# Patient Record
Sex: FEMALE
[De-identification: names, ages, dates, MRNs, and addresses within clinical notes are randomized per-mention and may not be internally consistent; named-entity substitution may affect disease eponyms.]

---

## 2019-03-01 ENCOUNTER — HOSPITAL ENCOUNTER (INPATIENT)
Dept: HOSPITAL 92 - L&D | Age: 34
LOS: 22 days | Discharge: HOME | End: 2019-03-23
Attending: OBSTETRICS & GYNECOLOGY | Admitting: OBSTETRICS & GYNECOLOGY
Payer: COMMERCIAL

## 2019-03-01 VITALS — BODY MASS INDEX: 27.2 KG/M2

## 2019-03-01 DIAGNOSIS — O34.211: Primary | ICD-10-CM

## 2019-03-01 DIAGNOSIS — Z3A.39: ICD-10-CM

## 2019-03-01 PROCEDURE — 87340 HEPATITIS B SURFACE AG IA: CPT

## 2019-03-01 PROCEDURE — 86901 BLOOD TYPING SEROLOGIC RH(D): CPT

## 2019-03-01 PROCEDURE — 85027 COMPLETE CBC AUTOMATED: CPT

## 2019-03-01 PROCEDURE — 36415 COLL VENOUS BLD VENIPUNCTURE: CPT

## 2019-03-01 PROCEDURE — 90715 TDAP VACCINE 7 YRS/> IM: CPT

## 2019-03-01 PROCEDURE — 86850 RBC ANTIBODY SCREEN: CPT

## 2019-03-01 PROCEDURE — 51702 INSERT TEMP BLADDER CATH: CPT

## 2019-03-01 PROCEDURE — 86900 BLOOD TYPING SEROLOGIC ABO: CPT

## 2019-03-01 PROCEDURE — 86870 RBC ANTIBODY IDENTIFICATION: CPT

## 2019-03-01 PROCEDURE — 86905 BLOOD TYPING RBC ANTIGENS: CPT

## 2019-03-01 PROCEDURE — 88307 TISSUE EXAM BY PATHOLOGIST: CPT

## 2019-03-01 PROCEDURE — 86780 TREPONEMA PALLIDUM: CPT

## 2019-03-01 PROCEDURE — 86922 COMPATIBILITY TEST ANTIGLOB: CPT

## 2019-03-19 NOTE — PDOC.LDHP
Labor and Delivery H&P


Chief complaint: scheduled  section


Current gestational age (weeks): 39


Dating criteria: last menstrual period


Grav: 4


Para: 3


Current pregnancy complications: other (prior c/s x3. Family breast cancer. 

desires RRS-approved)


Abnormal US findings: No


Current medications: pre-aleida vitamins


Previous surgical history: low tranverse CS


Social history: none





- Physical Exam


General: NAD, resting, breathing through contractions, other


FHT: category 1





- OB Labs


Blood type: AB


RH: positive


Antibody Screen: negative


HIV: negative


RPR: negative


HEPSAg: negative


1 hour GCT: negative


GBS: positive


Urine drug screen: not done


Rubella: immune





- Assessment


L&D Assessment: scheduled repeat  section (RRS due to family h/o breast 

cancer.)

## 2019-03-20 LAB
HBSAG INDEX: 0.41 S/CO (ref 0–0.99)
HGB BLD-MCNC: 13.5 G/DL (ref 12–16)
MCH RBC QN AUTO: 32.3 PG (ref 27–31)
MCV RBC AUTO: 93.9 FL (ref 78–98)
PLATELET # BLD AUTO: 119 THOU/UL (ref 130–400)
RBC # BLD AUTO: 4.2 MILL/UL (ref 4.2–5.4)
SYPHILIS ANTIBODY INDEX: 0.04 S/CO
WBC # BLD AUTO: 9.1 THOU/UL (ref 4.8–10.8)

## 2019-03-20 PROCEDURE — 0UB70ZZ EXCISION OF BILATERAL FALLOPIAN TUBES, OPEN APPROACH: ICD-10-PCS | Performed by: OBSTETRICS & GYNECOLOGY

## 2019-03-20 RX ADMIN — DOCUSATE CALCIUM SCH: 240 CAPSULE, LIQUID FILLED ORAL at 22:22

## 2019-03-20 NOTE — PDOC.OPDEL
OB Operative/Delivery Note


Delivery Dr/Surgeon: Gianfranco


Assist: Maxime


Pre-Delivery Diagnosis: scheduled  section


Procedure/Post Delivery Dx: repeat low transverse CS (Risk reduction 

salpingectomy)


Weeks gestation: 39


Anesthesia: spinal





- Additional Findings/Plan


Placenta delivered: manual removal


 findings: low transverse hysterotomy without extension, normal uterus, 

normal tubes, normal ovaries


Estimated blood loss: 500 ml


Compilations/Other Findings: 





none


Post delivery plan: routine recovery

## 2019-03-21 LAB
HGB BLD-MCNC: 11.1 G/DL (ref 12–16)
MCH RBC QN AUTO: 32 PG (ref 27–31)
MCV RBC AUTO: 93.7 FL (ref 78–98)
PLATELET # BLD AUTO: 107 THOU/UL (ref 130–400)
RBC # BLD AUTO: 3.46 MILL/UL (ref 4.2–5.4)
WBC # BLD AUTO: 13.9 THOU/UL (ref 4.8–10.8)

## 2019-03-21 RX ADMIN — DOCUSATE CALCIUM SCH MG: 240 CAPSULE, LIQUID FILLED ORAL at 20:43

## 2019-03-21 RX ADMIN — DOCUSATE CALCIUM SCH: 240 CAPSULE, LIQUID FILLED ORAL at 08:14

## 2019-03-21 RX ADMIN — SIMETHICONE PRN MG: 80 TABLET, CHEWABLE ORAL at 20:43

## 2019-03-21 NOTE — OP
DATE OF PROCEDURE:  2019



PREOPERATIVE DIAGNOSES:  

1. A 33-year-old  female, G4, P3, at 39 weeks.

2. Prior  x3.

3. Positive family history of breast cancer, desires risk reduction salpingectomy.



POSTOPERATIVE DIAGNOSES:  

1. A 33-year-old  female, G4, P3, at 39 weeks.

2. Prior  x3.

3. Positive family history of breast cancer, desires risk reduction salpingectomy.



PROCEDURES PERFORMED:  

1. Repeat low-transverse  section.

2. Bilateral salpingectomy.



ASSISTANT SURGEON:  Shira castillo MD.



ANESTHESIA:  Spinal block.



ESTIMATED BLOOD LOSS:  500 mL.



COMPLICATIONS:  None.



COUNTS:  Correct x2.



FINDINGS:  

1. Vigorous male infant, vertex presentation, clear amniotic fluid noted, weight 7

pounds 15 ounces and Apgars 8 and 9. 

2. Clear urine present in Navas catheter postprocedure with also normal uterus,

fallopian tubes, and ovaries. 



DISPOSITION:  Recovery room, stable.



DESCRIPTION OF PROCEDURE:  The patient previously received informed consent in

regard to surgery.  She was taken back to the operating room, where she received a

spinal block without complications.  She was then placed in supine position, prepped

and draped in usual sterile fashion.  Navas catheter along with SCDs were placed.  A

Pfannenstiel incision was made through the previous scar site, carried down the

fascia.  Fascia was nicked in the midline.  Fascial incision was extended

bilaterally with the use of curved Crain's scissors.  The rectus fascia was then

dissected superiorly and inferiorly off the rectus muscle bellies.  The rectus

muscle bellies were divided in the midline.  The peritoneal cavity was entered.  An

Miguel O retractor was placed and the peritoneal incision had been extended.  A

bladder flap was created dropping some of the adhesions of the vesicouterine

peritoneum out of the proposed uterine hysterotomy site.  A 2 cm uterine hysterotomy

was made and this was extended via finger fractionation.  The amniotic bag was

ruptured.  Clear fluid noted.  The baby was delivered in vertex presentation.  The

mouth and nares were bulb suctioned on the abdomen.  The cord was doubly clamped and

cut, and handed to the pediatric nurse in attendance.  Usual cord blood was

obtained.  Placenta was manually extracted.  Uterus was externalized and curetted of

any remaining placental fragments with dry laparotomy sponge.  Hysterotomy incision

was then closed with #1 Monocryl suture in double closure fashion with good

hemostasis confirmation. 



Attention was then turned to the bilateral salpingectomy.  The right fallopian tube

was located.  It was grasped with a [place] clamp in the midline.  Windows in the

mesosalpinx and avascular areas were made with Bovie cautery and these were all

ligated with 2-0 chromic suture and the tube was excised with Metzenbaum scissors.

Suture stitch over the mesosalpinx and midportion was also placed in simple stitch

fashion for hemostasis.  This was then repeated on the left fallopian tube in

likewise fashion.  A Chantel clamp was clamped on the tube and avascular planes were

visualized in the mesosalpinx and these were cauterized and the tube was ligated in

the mesosalpinx and it was excised with Metzenbaum scissors as fimbria was dissected

away from the ovary.  Hemostasis was confirmed.  The uterus was turned back into the

abdomen and Miguel O retractor was removed.  The hysterotomy incision was again

noted to be hemostatic.  The rectus muscle bellies were noted to be hemostatic prior

to fascial closure.  The fascia was closed with 0 PDS suture x2 in running

continuous fashion.  Subcutaneous tissue was irrigated, noted to be hemostatic prior

to skin closure with staples.  The surgery was terminated.  No anesthetic or

surgical complications. 





Job ID:  [id number]

## 2019-03-21 NOTE — PDOC.PP
Post Partum Progress Note


Post Partum Day #: 1


PO intake tolerated: yes


Flatus: yes


Ambulation: yes


 Vital Signs (12 hours)











  Temp Pulse Resp BP Pulse Ox


 


 03/21/19 08:19  98.8 F  77  20  119/74  94 L


 


 03/21/19 04:45  97.9 F  65  16  115/81 


 


 03/21/19 00:20  97.6 F  76  16  129/77 


 


 03/20/19 21:51  97.8 F  65  18  131/81 








 Weight











Weight                         149 lb

















- Physical Examination


Respiratory: clear to auscultation bilaterally, non-labored breathing


Abdominal: + bowel sounds, lochia, no distention, appropriately TTP


Result Diagrams: 


 03/21/19 06:59





Additional Labs: 


 Post Partum Labs











Blood Type  A POSITIVE   03/20/19  07:21    


 


Hep Bs Antigen  Non-Reactive S/CO (NonReactive)   03/20/19  06:20    














- Assessment/Plan





Doing well post op day 1 from repeat c/s #4 with RRS. Routine post op care.

## 2019-03-21 NOTE — OP
DATE OF PROCEDURE:  2019



Ms. Valeria Pena underwent a repeat  with primary surgeon is Dr. Lexis Medel.  For complete details, please refer to her operative note.  I functioned as

her first assist. 







Job ID:  [id number]

## 2019-03-22 RX ADMIN — SIMETHICONE PRN MG: 80 TABLET, CHEWABLE ORAL at 08:33

## 2019-03-22 RX ADMIN — DOCUSATE CALCIUM SCH: 240 CAPSULE, LIQUID FILLED ORAL at 22:37

## 2019-03-22 RX ADMIN — DOCUSATE CALCIUM SCH: 240 CAPSULE, LIQUID FILLED ORAL at 08:25

## 2019-03-22 RX ADMIN — HYDROCODONE BITARTRATE AND ACETAMINOPHEN PRN TAB: 5; 325 TABLET ORAL at 16:31

## 2019-03-22 RX ADMIN — HYDROCODONE BITARTRATE AND ACETAMINOPHEN PRN TAB: 5; 325 TABLET ORAL at 05:29

## 2019-03-22 RX ADMIN — HYDROCODONE BITARTRATE AND ACETAMINOPHEN PRN TAB: 5; 325 TABLET ORAL at 10:32

## 2019-03-22 RX ADMIN — HYDROCODONE BITARTRATE AND ACETAMINOPHEN PRN TAB: 5; 325 TABLET ORAL at 00:47

## 2019-03-22 NOTE — PDOC.PP
Post Partum Progress Note


Post Partum Day #: 2


PO intake tolerated: yes


Flatus: yes


Ambulation: yes


 Vital Signs (12 hours)











  Temp Pulse Resp BP


 


 19 05:30  97.8 F  68  16  121/67


 


 19 00:40  98.4 F  72  16  106/58 L








 Weight











Weight                         149 lb

















- Physical Examination


General: NAD


Cardiovascular: no m/r/g, RRR


Respiratory: clear to auscultation bilaterally, non-labored breathing


Abdominal: + bowel sounds, lochia


Extremities: negative homans (B)


Skin: CS incision dry & intact


Psychiatric: A&Ox3, normal affect


Result Diagrams: 


 19 06:59





Additional Labs: 


 Post Partum Labs











Blood Type  A POSITIVE   19  07:21    


 


Hep Bs Antigen  Non-Reactive S/CO (NonReactive)   19  06:20    











(1)  delivery delivered


Code(s): O82 - ENCOUNTER FOR  DELIVERY WITHOUT INDICATION   Status: 

Acute   





- Assessment/Plan





doing well  rx sent out.  home in am .  checked out to ob hosp

## 2019-03-23 VITALS — DIASTOLIC BLOOD PRESSURE: 59 MMHG | SYSTOLIC BLOOD PRESSURE: 112 MMHG | TEMPERATURE: 98.9 F

## 2019-03-23 RX ADMIN — SIMETHICONE PRN MG: 80 TABLET, CHEWABLE ORAL at 09:16

## 2019-03-23 RX ADMIN — DOCUSATE CALCIUM SCH MG: 240 CAPSULE, LIQUID FILLED ORAL at 09:15

## 2019-03-23 RX ADMIN — HYDROCODONE BITARTRATE AND ACETAMINOPHEN PRN TAB: 5; 325 TABLET ORAL at 04:49

## 2019-03-23 NOTE — DIS
DATE OF ADMISSION:  2019



DATE OF DISCHARGE:  2019



ADMITTING DIAGNOSES:  

1. Intrauterine pregnancy at 39 weeks.

2. Previous  section x3.

3. Family history of breast cancer desires risk-reduction salpingectomy, which had

been approved. 



DISCHARGE DIAGNOSES:  

1. Intrauterine pregnancy at 39 weeks.

2. Previous  section x3.

3. Family history of breast cancer desires risk-reduction salpingectomy, which had

been approved. 



PROCEDURE PERFORMED:  Repeat lower transverse  section with risk-reducing

salpingectomy one. 



CONSULTATIONS:  None.



HOSPITAL COURSE:  The patient is a 33-year-old female presenting to Labor and

Delivery at 39 weeks' gestation for a scheduled repeat  and risk-reducing

salpingectomy.  For complete operative details, please refer to the operative note.

In short, the course of the surgery was uncomplicated and the patient was sent to

recovery room in stable condition.  Her postdelivery hemoglobin is 11.1, hematocrit

32.4, and platelets at 107,000.  Her postoperative course has been uncomplicated.

She is now postoperative day 3.  She is reporting that she is having good pain

control, decreased lochia, and is able to void on her own and is tolerating a diet.

The patient has expressed desire interest to discharge home. 



PHYSICAL EXAMINATION:

VITAL SIGNS:  Today, her blood pressure is 115/65, temperature 98.0, pulse is 67,

and respiratory rate of 17. 

GENERAL:  She appears to be in no acute distress.  She is alert and oriented,

cooperative and pleasant to interact with. 

HEENT:  Head is normocephalic, atraumatic.  Fundus is firm.  Incision is clean, dry,

and intact with staples. 

EXTREMITIES:  Nontender, nonedematous.



DISCHARGE INSTRUCTIONS:  The patient is being discharged to home with Zofran at the

patient request.  She has her pain medications already sent to the pharmacy by Dr. Marie.  She has instructions to follow up with Dr. Medel next Monday, Tuesday, or

Wednesday for staple removal.  She has instructions to seek medical attention sooner

if she experiences fever, increasing pain or bleeding.  She has been given

limitations of 2 weeks.  No driving and 6 weeks of a 15 pounds lifting limit. 







Job ID:  [id number]

## 2020-06-15 ENCOUNTER — HOSPITAL ENCOUNTER (OUTPATIENT)
Dept: HOSPITAL 92 - BICULT | Age: 35
Discharge: HOME | End: 2020-06-15
Attending: PHYSICIAN ASSISTANT
Payer: COMMERCIAL

## 2020-06-15 DIAGNOSIS — R10.11: Primary | ICD-10-CM

## 2020-06-15 PROCEDURE — 76705 ECHO EXAM OF ABDOMEN: CPT

## 2020-06-15 NOTE — ULT
RIGHT UPPER QUADRANT ULTRASOUND:

 

HISTORY: 

A 34-year-old female with right upper quadrant pain.

 

FINDINGS: 

The liver, pancreas, gallbladder, and right kidney appear normal.  No free fluid is seen in the Abdelrahman
on's pouch.  The common duct measures 3 mm in diameter.

 

IMPRESSION: 

Normal exam.

 

POS: [facility]